# Patient Record
Sex: MALE | Race: WHITE | Employment: FULL TIME | URBAN - METROPOLITAN AREA
[De-identification: names, ages, dates, MRNs, and addresses within clinical notes are randomized per-mention and may not be internally consistent; named-entity substitution may affect disease eponyms.]

---

## 2020-02-25 ENCOUNTER — APPOINTMENT (OUTPATIENT)
Dept: MRI IMAGING | Facility: CLINIC | Age: 55
End: 2020-02-25
Attending: EMERGENCY MEDICINE

## 2020-02-25 ENCOUNTER — HOSPITAL ENCOUNTER (EMERGENCY)
Facility: CLINIC | Age: 55
Discharge: HOME | End: 2020-02-25
Attending: EMERGENCY MEDICINE | Admitting: EMERGENCY MEDICINE

## 2020-02-25 VITALS
HEIGHT: 68 IN | RESPIRATION RATE: 16 BRPM | BODY MASS INDEX: 26.52 KG/M2 | SYSTOLIC BLOOD PRESSURE: 152 MMHG | TEMPERATURE: 97.8 F | HEART RATE: 68 BPM | DIASTOLIC BLOOD PRESSURE: 113 MMHG | WEIGHT: 175 LBS | OXYGEN SATURATION: 97 %

## 2020-02-25 DIAGNOSIS — R20.2 PARESTHESIAS: ICD-10-CM

## 2020-02-25 DIAGNOSIS — R20.2 TINGLING OF LEFT UPPER EXTREMITY: ICD-10-CM

## 2020-02-25 LAB
CREAT SERPL-MCNC: 1.16 MG/DL (ref 0.66–1.25)
GFR SERPL CREATININE-BSD FRML MDRD: 72 ML/MIN/{1.73_M2}
INTERPRETATION ECG - MUSE: NORMAL
TROPONIN I SERPL-MCNC: <0.015 UG/L (ref 0–0.04)

## 2020-02-25 PROCEDURE — 25500064 ZZH RX 255 OP 636: Performed by: EMERGENCY MEDICINE

## 2020-02-25 PROCEDURE — 84484 ASSAY OF TROPONIN QUANT: CPT | Performed by: EMERGENCY MEDICINE

## 2020-02-25 PROCEDURE — 82565 ASSAY OF CREATININE: CPT | Performed by: EMERGENCY MEDICINE

## 2020-02-25 PROCEDURE — 93005 ELECTROCARDIOGRAM TRACING: CPT

## 2020-02-25 PROCEDURE — 70553 MRI BRAIN STEM W/O & W/DYE: CPT

## 2020-02-25 PROCEDURE — 99285 EMERGENCY DEPT VISIT HI MDM: CPT | Mod: 25

## 2020-02-25 PROCEDURE — A9585 GADOBUTROL INJECTION: HCPCS | Performed by: EMERGENCY MEDICINE

## 2020-02-25 RX ORDER — GADOBUTROL 604.72 MG/ML
8 INJECTION INTRAVENOUS ONCE
Status: COMPLETED | OUTPATIENT
Start: 2020-02-25 | End: 2020-02-25

## 2020-02-25 RX ADMIN — GADOBUTROL 8 ML: 604.72 INJECTION INTRAVENOUS at 10:56

## 2020-02-25 ASSESSMENT — ENCOUNTER SYMPTOMS: NUMBNESS: 1

## 2020-02-25 ASSESSMENT — MIFFLIN-ST. JEOR: SCORE: 1618.29

## 2020-02-25 NOTE — ED AVS SNAPSHOT
Emergency Department  [address] [place] [address]  Phone:  [phone]  Fax:  [phone]                                    Umang Lu   MRN: [id number]    Department:   Emergency Department   Date of Visit:  2/25/2020           After Visit Summary Signature Page    I have received my discharge instructions, and my questions have been answered. I have discussed any challenges I see with this plan with the nurse or doctor.    ..........................................................................................................................................  Patient/Patient Representative Signature      ..........................................................................................................................................  Patient Representative Print Name and Relationship to Patient    ..................................................               ................................................  Date                                   Time    ..........................................................................................................................................  Reviewed by Signature/Title    ...................................................              ..............................................  Date                                               Time          22EPIC Rev 08/18

## 2020-02-25 NOTE — ED TRIAGE NOTES
Hypertension, left numbness in arm and leg intermittently for two weeks. Sent from clinic for further eval.

## 2020-02-25 NOTE — ED PROVIDER NOTES
"  History     Chief Complaint:  Hypertension and tingling    The history is provided by the patient.      Umang Lu is a 54 year old male with a history of hypertension who presents with hypertension and tingling. According to the patient, he noticed some numbness and tingling in his left arm and leg a few weeks ago. He decided to come in to the clinic today, and they sent him to the ER. He explains his tingling sensation feels like the inside of his left arm and back of left leg are \"asleep\". He says this sensation has been intermittent since onset. The patient also notes he has had hypertension for about 5 years and has not been medicated for it, though he expresses interest in getting on med's.     Allergies:  No Known Allergies     Medications:    Toprol  Chantix     Past Medical History:    Depression with anxiety  GERD  Hypertension  Lesion of ulnar nerve   Persistent insomnia    Past Surgical History:    Appendectomy   [place] teeth extraction     Family History:    Hypertension  Cancer     Social History:  Smoking status: never smoker  Alcohol use: yes  Drug use: no  Presents to the ED alone  Marital Status:   [2]     Review of Systems   Neurological: Positive for numbness (tingling in left arm and leg ).   All other systems reviewed and are negative.      Physical Exam     Patient Vitals for the past 24 hrs:   BP Temp Temp src Pulse Resp SpO2 Height Weight   02/25/20 1230 (!) 152/113 -- -- 68 -- -- -- --   02/25/20 1215 (!) 145/108 -- -- 62 -- -- -- --   02/25/20 1200 (!) 165/115 -- -- 64 -- -- -- --   02/25/20 0911 (!) 196/116 97.8  F (36.6  C) Temporal 77 16 97 % 1.727 m (5' 8\") 79.4 kg (175 lb)       Physical Exam  Vitals: reviewed by me  General: Pt seen on [facility], pleasant, cooperative, and alert to conversation  Eyes: Tracking well, clear conjunctiva BL  ENT: MMM, midline trachea.   Lungs:  No tachypnea, no accessory muscle use. No respiratory distress.   CV: Rate as above, " regular rhythm.    Abd: Soft, non tender, no guarding, no rebound. Non distended  MSK: no peripheral edema or joint effusion.  No evidence of trauma  Skin: No rash, normal turgor and temperature  Neuro: Clear speech and no facial droop.  CN 2 - 12 in tact   BUE and BLE with SILT and 5/5 motor  Psych: Not RIS, no e/o AH/VH    Emergency Department Course   EKG:  Indication: chest pain   Time: 1147  Vent. Rate 65 bpm. WV interval 164. QRS duration 82. QT/QTc 420/436. P-R-T axis 34 15 52.  Normal sinus rhythm.  Nonspecific T wave abnormality.   Abnormal ECG. Read time: 1149     Imaging:  Radiology findings were communicated with the patient who voiced understanding of the findings.    MR Brain w/o contrast:  Normal brain MRI, as per radiology.      Laboratory:  Laboratory findings were communicated with the patient who voiced understanding of the findings.    ISTAT Creatinine nursing POCT: Creatinine 1.16, GFR 72     0928 Troponin: <0.015      Procedures:  None     Interventions:  1056 Gadavist 8 mL IV    Emergency Department Course:  Past medical records, nursing notes, and vitals reviewed.    0918 I performed an exam of the patient as documented above.     EKG obtained in the ED, see results above.      Blood was drawn for laboratory testing, results above.    The patient was sent for a puneet MRI while in the emergency department, results above.     1138 Patient rechecked and updated. Patient now reports chest pain.     1254 Patient rechecked and updated.      Findings and plan explained to the Patient. Patient discharged home with instructions regarding supportive care, medications, and reasons to return. The importance of close follow-up was reviewed.    I personally reviewed the laboratory and imaging results with the Patient and answered all related questions prior to discharge.      Impression & Plan     Medical Decision Making:  Umang Lu is a very pleasant 54 year old male who presents to the emergency  department today with two weeks of left arm and left leg paraesthesias. He is asymptomatic on the right side, and here in the ER he has a normal neurologic exam for me. Out of an  abundance of caution for a possible TIA or micro-ischemic event, I did do an MRI and thankfully he has no evidence of a stroke. When I explained this to him, he also endorsed a mild amount of chest pressure for the last 2 weeks, but thankfully he has a normal EKG and a negative troponin as well. I am unable to determine exactly why he has these paraesthesias, but the main benefit we can offer is to encourage him to get routine, age appropriate screening and management of any chronic diseases that he has had, as it appears as though he has not been taking medication for his known high blood pressure for at least the last 5 years. I do not think that his symptoms are severe enough to keep him here in the hospital and I think that he can safely be managed in the outpatient setting, barring any changes. He knows to come back to the ER with any changing or worsening symptoms. Pain free and paraesthesia free at time of discharge.     Critical Care Time: was 0 minutes for this patient excluding procedures    Discharge Diagnosis:    ICD-10-CM    1. Paresthesias R20.2 Troponin I     Troponin I     CANCELED: Troponin I     CANCELED: Troponin I   2. Tingling of left upper extremity R20.2        Disposition:  Discharged to home.       Scribe Disclosure:  I, Vernell Valenzuela, am serving as a scribe at 9:18 AM on 2/25/2020 to document services personally performed by Moses Law MD based on my observations and the provider's statements to me.    2/25/2020    EMERGENCY DEPARTMENT       Moses Law MD  02/25/20 0158